# Patient Record
Sex: FEMALE | Race: WHITE | NOT HISPANIC OR LATINO | URBAN - METROPOLITAN AREA
[De-identification: names, ages, dates, MRNs, and addresses within clinical notes are randomized per-mention and may not be internally consistent; named-entity substitution may affect disease eponyms.]

---

## 2017-01-23 ENCOUNTER — OUTPATIENT (OUTPATIENT)
Dept: OUTPATIENT SERVICES | Facility: HOSPITAL | Age: 77
LOS: 1 days | Discharge: HOME | End: 2017-01-23

## 2017-06-27 DIAGNOSIS — Z12.31 ENCOUNTER FOR SCREENING MAMMOGRAM FOR MALIGNANT NEOPLASM OF BREAST: ICD-10-CM

## 2018-02-02 ENCOUNTER — OUTPATIENT (OUTPATIENT)
Dept: OUTPATIENT SERVICES | Facility: HOSPITAL | Age: 78
LOS: 1 days | Discharge: HOME | End: 2018-02-02

## 2018-02-06 DIAGNOSIS — M54.30 SCIATICA, UNSPECIFIED SIDE: ICD-10-CM

## 2018-02-06 DIAGNOSIS — H25.89 OTHER AGE-RELATED CATARACT: ICD-10-CM

## 2018-02-06 DIAGNOSIS — K58.9 IRRITABLE BOWEL SYNDROME WITHOUT DIARRHEA: ICD-10-CM

## 2018-02-06 DIAGNOSIS — G62.9 POLYNEUROPATHY, UNSPECIFIED: ICD-10-CM

## 2018-02-06 DIAGNOSIS — E03.9 HYPOTHYROIDISM, UNSPECIFIED: ICD-10-CM

## 2018-02-09 ENCOUNTER — OUTPATIENT (OUTPATIENT)
Dept: OUTPATIENT SERVICES | Facility: HOSPITAL | Age: 78
LOS: 1 days | Discharge: HOME | End: 2018-02-09

## 2018-02-09 VITALS
HEIGHT: 70 IN | OXYGEN SATURATION: 97 % | DIASTOLIC BLOOD PRESSURE: 78 MMHG | RESPIRATION RATE: 17 BRPM | SYSTOLIC BLOOD PRESSURE: 149 MMHG | HEART RATE: 80 BPM | TEMPERATURE: 98 F | WEIGHT: 141.98 LBS

## 2018-02-09 VITALS — RESPIRATION RATE: 17 BRPM | HEART RATE: 80 BPM | SYSTOLIC BLOOD PRESSURE: 150 MMHG | DIASTOLIC BLOOD PRESSURE: 86 MMHG

## 2018-02-09 DIAGNOSIS — N60.49 MAMMARY DUCT ECTASIA OF UNSPECIFIED BREAST: Chronic | ICD-10-CM

## 2018-02-09 DIAGNOSIS — Z98.890 OTHER SPECIFIED POSTPROCEDURAL STATES: Chronic | ICD-10-CM

## 2018-02-09 RX ORDER — SODIUM CHLORIDE 9 MG/ML
1000 INJECTION INTRAMUSCULAR; INTRAVENOUS; SUBCUTANEOUS
Qty: 0 | Refills: 0 | Status: DISCONTINUED | OUTPATIENT
Start: 2018-02-09 | End: 2018-02-24

## 2018-02-09 RX ORDER — ACETAMINOPHEN 500 MG
650 TABLET ORAL ONCE
Qty: 0 | Refills: 0 | Status: DISCONTINUED | OUTPATIENT
Start: 2018-02-09 | End: 2018-02-24

## 2018-02-09 RX ORDER — LEVOTHYROXINE SODIUM 125 MCG
1 TABLET ORAL
Qty: 0 | Refills: 0 | COMMUNITY

## 2018-02-09 RX ORDER — ZOLPIDEM TARTRATE 10 MG/1
1 TABLET ORAL
Qty: 0 | Refills: 0 | COMMUNITY

## 2018-02-09 RX ORDER — TAPENTADOL HYDROCHLORIDE 50 MG/1
1 TABLET, FILM COATED ORAL
Qty: 0 | Refills: 0 | COMMUNITY

## 2018-02-20 DIAGNOSIS — E03.9 HYPOTHYROIDISM, UNSPECIFIED: ICD-10-CM

## 2018-02-20 DIAGNOSIS — H25.89 OTHER AGE-RELATED CATARACT: ICD-10-CM

## 2019-11-08 PROBLEM — G35 MULTIPLE SCLEROSIS: Chronic | Status: ACTIVE | Noted: 2018-02-09

## 2019-11-08 PROBLEM — E78.6 LIPOPROTEIN DEFICIENCY: Chronic | Status: ACTIVE | Noted: 2018-02-09

## 2019-11-08 PROBLEM — H26.9 UNSPECIFIED CATARACT: Chronic | Status: ACTIVE | Noted: 2018-02-09

## 2019-11-08 PROBLEM — E03.9 HYPOTHYROIDISM, UNSPECIFIED: Chronic | Status: ACTIVE | Noted: 2018-02-09

## 2019-11-26 ENCOUNTER — OUTPATIENT (OUTPATIENT)
Dept: OUTPATIENT SERVICES | Facility: HOSPITAL | Age: 79
LOS: 1 days | Discharge: HOME | End: 2019-11-26
Payer: MEDICARE

## 2019-11-26 DIAGNOSIS — Z98.890 OTHER SPECIFIED POSTPROCEDURAL STATES: Chronic | ICD-10-CM

## 2019-11-26 DIAGNOSIS — N60.49 MAMMARY DUCT ECTASIA OF UNSPECIFIED BREAST: Chronic | ICD-10-CM

## 2019-11-26 DIAGNOSIS — Z12.31 ENCOUNTER FOR SCREENING MAMMOGRAM FOR MALIGNANT NEOPLASM OF BREAST: ICD-10-CM

## 2019-11-26 PROCEDURE — 77063 BREAST TOMOSYNTHESIS BI: CPT | Mod: 26

## 2019-11-26 PROCEDURE — 77067 SCR MAMMO BI INCL CAD: CPT | Mod: 26

## 2019-12-26 ENCOUNTER — APPOINTMENT (OUTPATIENT)
Dept: ENDOCRINOLOGY | Facility: CLINIC | Age: 79
End: 2019-12-26

## 2021-01-26 PROBLEM — Z00.00 ENCOUNTER FOR PREVENTIVE HEALTH EXAMINATION: Status: ACTIVE | Noted: 2021-01-26

## 2021-05-07 ENCOUNTER — RESULT CHARGE (OUTPATIENT)
Age: 81
End: 2021-05-07

## 2021-05-07 ENCOUNTER — APPOINTMENT (OUTPATIENT)
Dept: UROGYNECOLOGY | Facility: CLINIC | Age: 81
End: 2021-05-07
Payer: MEDICARE

## 2021-05-07 ENCOUNTER — OUTPATIENT (OUTPATIENT)
Dept: OUTPATIENT SERVICES | Facility: HOSPITAL | Age: 81
LOS: 1 days | Discharge: HOME | End: 2021-05-07

## 2021-05-07 VITALS
SYSTOLIC BLOOD PRESSURE: 130 MMHG | HEIGHT: 69 IN | DIASTOLIC BLOOD PRESSURE: 62 MMHG | WEIGHT: 136 LBS | BODY MASS INDEX: 20.14 KG/M2

## 2021-05-07 DIAGNOSIS — Z78.9 OTHER SPECIFIED HEALTH STATUS: ICD-10-CM

## 2021-05-07 DIAGNOSIS — Z98.890 OTHER SPECIFIED POSTPROCEDURAL STATES: Chronic | ICD-10-CM

## 2021-05-07 DIAGNOSIS — Z87.19 PERSONAL HISTORY OF OTHER DISEASES OF THE DIGESTIVE SYSTEM: ICD-10-CM

## 2021-05-07 DIAGNOSIS — G35 MULTIPLE SCLEROSIS: ICD-10-CM

## 2021-05-07 DIAGNOSIS — R33.9 RETENTION OF URINE, UNSPECIFIED: ICD-10-CM

## 2021-05-07 DIAGNOSIS — N60.49 MAMMARY DUCT ECTASIA OF UNSPECIFIED BREAST: Chronic | ICD-10-CM

## 2021-05-07 DIAGNOSIS — G62.9 POLYNEUROPATHY, UNSPECIFIED: ICD-10-CM

## 2021-05-07 DIAGNOSIS — E78.00 PURE HYPERCHOLESTEROLEMIA, UNSPECIFIED: ICD-10-CM

## 2021-05-07 LAB
BILIRUB UR QL STRIP: NORMAL
CLARITY UR: CLEAR
COLLECTION METHOD: NORMAL
GLUCOSE UR-MCNC: NORMAL
HCG UR QL: 0.2 EU/DL
HGB UR QL STRIP.AUTO: NORMAL
KETONES UR-MCNC: NORMAL
LEUKOCYTE ESTERASE UR QL STRIP: NORMAL
NITRITE UR QL STRIP: NORMAL
PH UR STRIP: 6
PROT UR STRIP-MCNC: NORMAL
SP GR UR STRIP: 1.02

## 2021-05-07 PROCEDURE — 99205 OFFICE O/P NEW HI 60 MIN: CPT

## 2021-05-07 PROCEDURE — 51701 INSERT BLADDER CATHETER: CPT

## 2021-05-07 RX ORDER — CIPROFLOXACIN HYDROCHLORIDE 500 MG/1
500 TABLET, FILM COATED ORAL
Qty: 14 | Refills: 0 | Status: COMPLETED | COMMUNITY
Start: 2021-05-07 | End: 2021-05-14

## 2021-05-07 NOTE — PHYSICAL EXAM
[Chaperone Present] : A chaperone was present in the examining room during all aspects of the physical examination [FreeTextEntry1] : Void: 600 cc\par PVR: 180 cc (normal PVR for this volume voided is 260cc or less)\par Urethra was prepped in sterile fashion and then a sterile catheter was used by me to drain the bladder.\par  \par No abdominal incisions noted\par normal perineal sensation\par absent perineal reflexes\par negative cough stress test\par positive atrophy\par positive urethral caruncle\par no prolapse\par positive urethral hypermobility\par bilateral levator ani spasm,  no tenderness\par no urethral tenderness\par no bladder tenderness\par no cervical tenderness\par no uterine tenderness\par 0/5 Kegel\par

## 2021-05-07 NOTE — DISCUSSION/SUMMARY
[FreeTextEntry1] : \par History of UTI-\par Advised the patient that recurrent UTIs are defined as having 3 or more positive urine culture in 1 year or 2 or more in 6 months, which she has not had. Advised to call the office if she feels like she has an infection so that we can arrange testing of her urine. If she keeps getting infections then I will recommend a workup of further evaluation of her kidneys and bladder and further prevention treatment including estrogen vaginal cream and daily antibiotic suppression. The patient voiced understanding and agrees with the plan.\par \par Atrophic vaginitis-\par We reviewed the risks, benefits, alternatives and indications of local estrogen therapy and I gave her a handout that covers this information. She does opt to begin this therapy. I have given her a prescription and specific instructions on how to use the estrogen cream, applied with a finger at a low dose for urogenital atrophy.\par \par History of incomplete bladder emptying-\par Advised the patient that the only thing that can cause intermittent incomplete bladder emptying is an active UTI, constipation or pelvic floor muscle hypertonicity. The patient will continue working on constipation control with her senna, we will work on management for her UTIs and if she starts not emptying well we can then have her start taking her flexeril twice a day and every day.

## 2021-05-07 NOTE — COUNSELING
[FreeTextEntry1] : \par We will notify you of the urine results\par \par We will work on getting the urine testing and the urodynamics from Dr Sorensen's office to determine if you need further evaluation of your kidneys and bladder\par \par Please take the cipro twice a day for 7 days\par \par Apply a pea size amount of the cream to the opening of the vagina every night for two weeks followed by three nights per week\par \par Please call my office if you have any issues with the cost or side effects of the medication.\par \par Follow up will be scheduled based on the urine results\par \par Please call the office if you feel like you have an infection so that we can arrange testing of your urine\par \par Please let my office know if you would like to schedule urodynamics at our office

## 2021-05-07 NOTE — HISTORY OF PRESENT ILLNESS
[FreeTextEntry1] : \par Pt with pelvic floor dysfunction here for urogynecologic evaluation. She describes: \par \par Chief PFD: UTI\par \par History of MS, seeing Dr Sorensen's group for years.\par Had urodynamics 2 years ago, PVR was not abnormal but was told that she does not empty well and that is why she gets infections\par Scheduled for urodynamics in 2 weeks\par Has multiple UTIs: symptoms: balance worsens, no hematuria, no frequency, no dysuria, gets treated with cipro and feels better\par No cysto performed\par 10/29/20: renal sono: no hydro\par Last one in 4/21. Feels like she has one now because she had an incontinence episode last night but has no worsening of balance issues\par \par Pelvic organ prolapse: no bulge, denies splinting\par Stress urinary incontinence: denies\par Overactive bladder syndrome: +frequency, urgency, no eneuresis, min urge incontinence daily, tried trospium for 3 months (severe dry mouth, needed to see ENT and dentist), no glaucoma\par Voiding dysfunction: yes Incomplete bladder emptying, yes hesitancy \par Lower urinary tract/vaginal symptoms: as above UTIs per year, no hematuria, no dysuria, no bladder pain \par Fecal incontinence: denies\par Defecatory dysfunction: sausage and Type I\par Sexual dysfunction: not active\par Pelvic pain: on flexeril once in a while, denies pelvic pain\par Vaginal dryness: denies \par \par Her pelvic floor symptoms are significantly bothersome and negatively impacting her quality of life. \par \par

## 2021-05-10 LAB
APPEARANCE: CLEAR
BACTERIA UR CULT: NORMAL
BILIRUBIN URINE: NEGATIVE
BLOOD URINE: NEGATIVE
COLOR: COLORLESS
GLUCOSE QUALITATIVE U: NEGATIVE
KETONES URINE: NEGATIVE
LEUKOCYTE ESTERASE URINE: NEGATIVE
NITRITE URINE: NEGATIVE
PH URINE: 6.5
PROTEIN URINE: NEGATIVE
SPECIFIC GRAVITY URINE: 1
UROBILINOGEN URINE: NORMAL

## 2021-05-14 ENCOUNTER — NON-APPOINTMENT (OUTPATIENT)
Age: 81
End: 2021-05-14

## 2021-05-20 DIAGNOSIS — N95.2 POSTMENOPAUSAL ATROPHIC VAGINITIS: ICD-10-CM

## 2021-05-20 DIAGNOSIS — Z87.440 PERSONAL HISTORY OF URINARY (TRACT) INFECTIONS: ICD-10-CM

## 2021-05-20 DIAGNOSIS — R33.9 RETENTION OF URINE, UNSPECIFIED: ICD-10-CM

## 2021-06-11 ENCOUNTER — OUTPATIENT (OUTPATIENT)
Dept: OUTPATIENT SERVICES | Facility: HOSPITAL | Age: 81
LOS: 1 days | Discharge: HOME | End: 2021-06-11

## 2021-06-11 ENCOUNTER — APPOINTMENT (OUTPATIENT)
Dept: UROGYNECOLOGY | Facility: CLINIC | Age: 81
End: 2021-06-11
Payer: MEDICARE

## 2021-06-11 VITALS — BODY MASS INDEX: 20.08 KG/M2 | SYSTOLIC BLOOD PRESSURE: 120 MMHG | DIASTOLIC BLOOD PRESSURE: 64 MMHG | WEIGHT: 136 LBS

## 2021-06-11 DIAGNOSIS — Z98.890 OTHER SPECIFIED POSTPROCEDURAL STATES: Chronic | ICD-10-CM

## 2021-06-11 DIAGNOSIS — N60.49 MAMMARY DUCT ECTASIA OF UNSPECIFIED BREAST: Chronic | ICD-10-CM

## 2021-06-11 PROCEDURE — 51797 INTRAABDOMINAL PRESSURE TEST: CPT | Mod: 26

## 2021-06-11 PROCEDURE — 51741 ELECTRO-UROFLOWMETRY FIRST: CPT | Mod: 26

## 2021-06-11 PROCEDURE — 51728 CYSTOMETROGRAM W/VP: CPT | Mod: 26

## 2021-06-11 PROCEDURE — 51784 ANAL/URINARY MUSCLE STUDY: CPT | Mod: 26

## 2021-06-22 DIAGNOSIS — Z87.440 PERSONAL HISTORY OF URINARY (TRACT) INFECTIONS: ICD-10-CM

## 2021-07-22 ENCOUNTER — NON-APPOINTMENT (OUTPATIENT)
Age: 81
End: 2021-07-22

## 2021-08-08 ENCOUNTER — TRANSCRIPTION ENCOUNTER (OUTPATIENT)
Age: 81
End: 2021-08-08

## 2021-09-21 ENCOUNTER — OUTPATIENT (OUTPATIENT)
Dept: OUTPATIENT SERVICES | Facility: HOSPITAL | Age: 81
LOS: 1 days | Discharge: HOME | End: 2021-09-21

## 2021-09-21 ENCOUNTER — APPOINTMENT (OUTPATIENT)
Dept: UROGYNECOLOGY | Facility: CLINIC | Age: 81
End: 2021-09-21
Payer: MEDICARE

## 2021-09-21 VITALS — BODY MASS INDEX: 19.64 KG/M2 | DIASTOLIC BLOOD PRESSURE: 75 MMHG | WEIGHT: 133 LBS | SYSTOLIC BLOOD PRESSURE: 125 MMHG

## 2021-09-21 DIAGNOSIS — N60.49 MAMMARY DUCT ECTASIA OF UNSPECIFIED BREAST: Chronic | ICD-10-CM

## 2021-09-21 DIAGNOSIS — Z98.890 OTHER SPECIFIED POSTPROCEDURAL STATES: Chronic | ICD-10-CM

## 2021-09-21 PROCEDURE — 51701 INSERT BLADDER CATHETER: CPT

## 2021-09-21 PROCEDURE — 99214 OFFICE O/P EST MOD 30 MIN: CPT | Mod: 25

## 2021-09-21 NOTE — PHYSICAL EXAM
[Chaperone Present] : A chaperone was present in the examining room during all aspects of the physical examination [No Acute Distress] : in no acute distress [Well developed] : well developed [Well Nourished] : ~L well nourished [FreeTextEntry1] : Urethra was prepped in sterile fashion and then a sterile catheter was used by me to drain the bladder.\par void: 5cc\par PVR: 180cc

## 2021-09-21 NOTE — HISTORY OF PRESENT ILLNESS
[FreeTextEntry1] : Patient is here for 2 months follow up for PVR check for YAAKOV.\par Last seen on 6/11/21 for UDS for YAAKOV.\par \par History of MS, seeing Dr Sorensen's group for years.\par Had urodynamics 2 years ago, PVR was not abnormal but was told that she does not empty well and that is why she gets infections\par \par s/p Trospium x 3 months, severe dry mouth\par no glaucoma\par \par 10/29/20: renal sono: no hydro\par S/p flexeril prn back pain\par \par no prolapse\par \par Records from Dr Sorensen's office reviewed:\par 4/21/21 NGTD\par 4/14/21 UCX: mixed janna\par 12/2/20: NGTD\par 11/10/20 NGTD\par 10/9/20 NGTD\par 6/8/20: NGTD\par \par Estrace cream \par \par Flexeril 5mg BID\par 6/11/21, UDS: intermittent YAAKOV, add Flexeril and repeat PVR\par \par Today, patient states that she's taking Flexeril 5mg BID but does not notice any different in her urination. Denies side effects. Patient does not feel she has an infection.\par \par \par

## 2021-09-21 NOTE — COUNSELING
[FreeTextEntry1] : If you feel like you have an infection it is important for you to call our office and we will arrange testing of your urine.\par \par We will contact you if the urine results are abnormal.\par \par Please use bowel recipe for constipation management. You may also try Smooth Move tea (available on [employer]) for constipation. \par \par Please STOP taking Flexeril 5 mg.\par \par Please begin taking Flexeril 10mg twice a day. \par \par Please begin taking Flomax 0.4 mg once at bedtime. It takes up to 6 weeks to go into full effect. Please  your refill when you complete the 1st bottle.\par \par Please continue to apply a pea size amount to the opening of the vagina three times a week. \par \par Please call my office if you have any issues with the cost or side effects of the medication. \par \par Schedule a 6 weeks follow up med check appointment and PVR check.\par

## 2021-09-23 DIAGNOSIS — Z87.440 PERSONAL HISTORY OF URINARY (TRACT) INFECTIONS: ICD-10-CM

## 2021-09-23 DIAGNOSIS — M62.89 OTHER SPECIFIED DISORDERS OF MUSCLE: ICD-10-CM

## 2021-09-23 DIAGNOSIS — K59.00 CONSTIPATION, UNSPECIFIED: ICD-10-CM

## 2021-09-23 DIAGNOSIS — R33.9 RETENTION OF URINE, UNSPECIFIED: ICD-10-CM

## 2021-09-23 DIAGNOSIS — N95.2 POSTMENOPAUSAL ATROPHIC VAGINITIS: ICD-10-CM

## 2021-09-23 LAB — BACTERIA UR CULT: NORMAL

## 2021-09-27 ENCOUNTER — NON-APPOINTMENT (OUTPATIENT)
Age: 81
End: 2021-09-27

## 2021-11-10 ENCOUNTER — APPOINTMENT (OUTPATIENT)
Dept: UROGYNECOLOGY | Facility: CLINIC | Age: 81
End: 2021-11-10
Payer: MEDICARE

## 2021-11-10 ENCOUNTER — OUTPATIENT (OUTPATIENT)
Dept: OUTPATIENT SERVICES | Facility: HOSPITAL | Age: 81
LOS: 1 days | Discharge: HOME | End: 2021-11-10

## 2021-11-10 VITALS — WEIGHT: 135 LBS | SYSTOLIC BLOOD PRESSURE: 145 MMHG | DIASTOLIC BLOOD PRESSURE: 75 MMHG | BODY MASS INDEX: 19.94 KG/M2

## 2021-11-10 DIAGNOSIS — Z98.890 OTHER SPECIFIED POSTPROCEDURAL STATES: Chronic | ICD-10-CM

## 2021-11-10 DIAGNOSIS — R33.9 RETENTION OF URINE, UNSPECIFIED: ICD-10-CM

## 2021-11-10 DIAGNOSIS — K59.00 CONSTIPATION, UNSPECIFIED: ICD-10-CM

## 2021-11-10 DIAGNOSIS — Z87.440 PERSONAL HISTORY OF URINARY (TRACT) INFECTIONS: ICD-10-CM

## 2021-11-10 DIAGNOSIS — N95.2 POSTMENOPAUSAL ATROPHIC VAGINITIS: ICD-10-CM

## 2021-11-10 DIAGNOSIS — N60.49 MAMMARY DUCT ECTASIA OF UNSPECIFIED BREAST: Chronic | ICD-10-CM

## 2021-11-10 DIAGNOSIS — M62.89 OTHER SPECIFIED DISORDERS OF MUSCLE: ICD-10-CM

## 2021-11-10 PROCEDURE — 51701 INSERT BLADDER CATHETER: CPT

## 2021-11-10 PROCEDURE — 99214 OFFICE O/P EST MOD 30 MIN: CPT | Mod: 25

## 2021-11-10 NOTE — DISCUSSION/SUMMARY
[FreeTextEntry1] : Incomplete Bladder Emptying\par Urine culture obtained.\par Will follow up.\par Will treat accordingly if necessary\par Cont Silodosin 4 mg QHS\par \par Muscle Hypertonicity\par Cont  Flexeril to 10mg BID\par \par Constipation\par Try bowel recipe, cont senna\par \par History of UTIs\par Cont estrace cream \par \par Vaginal Atrophy\par Cont estrace cream 3x a week\par \par \par \par

## 2021-11-10 NOTE — HISTORY OF PRESENT ILLNESS
[FreeTextEntry1] : Patient is here for 6 weeks med check and  PVR check for YAAKOV.\par Last seen on 9/21/21 for follow up PVR check: 180cc  \par \par History of MS, seeing Dr Sorensen's group for years.\par Had urodynamics 2 years ago, PVR was not abnormal but was told that she does not empty well and that is why she gets infections\par \par s/p Trospium x 3 months, severe dry mouth\par no glaucoma\par \par 10/29/20: renal sono: no hydro\par S/p flexeril prn back pain\par \par no prolapse\par \par Records from Dr Sorensen's office reviewed:\par 4/21/21 NGTD\par 4/14/21 UCX: mixed janna\par 12/2/20: NGTD\par 11/10/20 NGTD\par 10/9/20 NGTD\par 6/8/20: NGTD\par \par Estrace cream \par \par s/p Flexeril 5mg BID\par 6/11/21, UDS: intermittent YAAKOV, add Flexeril and repeat PVR\par \par Flexeril 10mg BID\par Pt is allergic to Sulfa and cannot take Flomax\par Silodosin 4mg QHS\par \par Today pt states that initially with Flexeril 10mg BID she felt that she was emptying her bladder better but now feels the same. C/o feeling that she doesn't empty her bladder completely every time. Feels that after she urinates and leaves the house in 2 hours she needs to urinate again. She is bothered by these symptoms. \par \par

## 2021-11-10 NOTE — PHYSICAL EXAM
[Chaperone Present] : A chaperone was present in the examining room during all aspects of the physical examination [FreeTextEntry1] : Urethra was prepped in sterile fashion and then a sterile catheter was used by me to drain the bladder.\par void: 0\par Cath: 280cc [No Acute Distress] : in no acute distress [Well developed] : well developed [Well Nourished] : ~L well nourished

## 2021-11-10 NOTE — COUNSELING
[FreeTextEntry1] : If you feel like you have an infection it is important for you to call our office and we will arrange testing of your urine.\par \par Please continue taking Silodosin 4mg and Flexeril 10mg twice a day.\par \par Please continue to apply a pea size amount to the opening of the vagina three times a week. \par \par Please call my office if you have any issues with the cost or side effects of the medication. \par \par I will call you after discussing with Dr Vallecillo. \par

## 2021-11-14 LAB — BACTERIA UR CULT: NORMAL

## 2021-11-16 ENCOUNTER — NON-APPOINTMENT (OUTPATIENT)
Age: 81
End: 2021-11-16

## 2021-11-17 ENCOUNTER — NON-APPOINTMENT (OUTPATIENT)
Age: 81
End: 2021-11-17

## 2021-11-17 RX ORDER — CYCLOBENZAPRINE HYDROCHLORIDE 10 MG/1
10 TABLET, FILM COATED ORAL TWICE DAILY
Qty: 60 | Refills: 5 | Status: DISCONTINUED | COMMUNITY
Start: 1900-01-01 | End: 2021-11-17

## 2021-12-10 ENCOUNTER — NON-APPOINTMENT (OUTPATIENT)
Age: 81
End: 2021-12-10

## 2021-12-20 ENCOUNTER — NON-APPOINTMENT (OUTPATIENT)
Age: 81
End: 2021-12-20

## 2022-01-19 RX ORDER — CIPROFLOXACIN HYDROCHLORIDE 500 MG/1
500 TABLET, FILM COATED ORAL
Qty: 10 | Refills: 0 | Status: COMPLETED | COMMUNITY
Start: 2022-01-19 | End: 2022-01-24

## 2022-01-21 ENCOUNTER — NON-APPOINTMENT (OUTPATIENT)
Age: 82
End: 2022-01-21

## 2022-01-28 ENCOUNTER — APPOINTMENT (OUTPATIENT)
Dept: UROGYNECOLOGY | Facility: CLINIC | Age: 82
End: 2022-01-28
Payer: MEDICARE

## 2022-01-28 ENCOUNTER — OUTPATIENT (OUTPATIENT)
Dept: OUTPATIENT SERVICES | Facility: HOSPITAL | Age: 82
LOS: 1 days | Discharge: HOME | End: 2022-01-28

## 2022-01-28 VITALS
WEIGHT: 135 LBS | BODY MASS INDEX: 19.99 KG/M2 | SYSTOLIC BLOOD PRESSURE: 140 MMHG | HEIGHT: 69 IN | DIASTOLIC BLOOD PRESSURE: 62 MMHG

## 2022-01-28 DIAGNOSIS — Z98.890 OTHER SPECIFIED POSTPROCEDURAL STATES: Chronic | ICD-10-CM

## 2022-01-28 DIAGNOSIS — N60.49 MAMMARY DUCT ECTASIA OF UNSPECIFIED BREAST: Chronic | ICD-10-CM

## 2022-01-28 PROCEDURE — 99214 OFFICE O/P EST MOD 30 MIN: CPT

## 2022-01-28 RX ORDER — TIZANIDINE 2 MG/1
2 TABLET ORAL
Qty: 60 | Refills: 1 | Status: DISCONTINUED | COMMUNITY
Start: 2021-11-17 | End: 2022-01-28

## 2022-01-28 NOTE — PHYSICAL EXAM
[Chaperone Present] : A chaperone was present in the examining room during all aspects of the physical examination [No Acute Distress] : in no acute distress [Well developed] : well developed [Well Nourished] : ~L well nourished [FreeTextEntry1] : Urethra was prepped in sterile fashion and then a sterile catheter was used by me to drain the bladder. \par void: 300 cc\par PVR: 100 cc

## 2022-01-28 NOTE — COUNSELING
[FreeTextEntry1] : If you feel like you have an infection it is important for you to call our office and we will arrange testing of your urine.\par \par We will contact you if the urine results are abnormal.\par \par Please continue taking Flexeril 10mg twice a day. Refills sent to your pharmacy.\par \par Please continue taking Silodosin 4mg once a day.  \par \par Please continue to apply a pea size amount to the opening of the vagina three times a week.\par \par Please call my office if you have any issues with the cost or side effects of the medication. \par \par Schedule a 6 months follow up med check appointment.\par

## 2022-01-28 NOTE — HISTORY OF PRESENT ILLNESS
[FreeTextEntry1] : Patient is here for 2 months med check and PVR check for YAAKOV.\par Last seen on 11/10/2021 for med check.\par \par History of MS, seeing Dr Sorensen's group for years.\par Had urodynamics 2 years ago, PVR was not abnormal but was told that she does not empty well and that is why she gets infections\par \par s/p Trospium x 3 months, severe dry mouth\par no glaucoma\par \par 10/29/20: renal sono: no hydro\par S/p flexeril prn back pain\par \par no prolapse\par \par Records from Dr Sorensen's office reviewed:\par 4/21/21 NGTD\par 4/14/21 UCX: mixed janna\par 12/2/20: NGTD\par 11/10/20 NGTD\par 10/9/20 NGTD\par 6/8/20: NGTD\par \par Estrace cream \par \par s/p Flexeril 5mg BID\par 6/11/21, UDS: intermittent YAAKOV, add Flexeril and repeat PVR\par \par Flexeril 10mg BID\par Pt is allergic to Sulfa and cannot take Flomax\par Silodosin 4mg QHS\par \par S/p Baclofen, said made her feet feel heavy\par S/p Tizanidine 2mg: make her legs weak, heavy\par \par 1/19/2021 Patient was treated for UTI (culture was not obtained) with Cipro 500 mg bid for 5 days. Patient reports taking antibiotics for 7 days with relief of her symptoms.  \par \par Today, patient states she tried Tizanidine and it made her legs weak and heavy and she went back to taking Flexeril 10 mg BID and is noticing improvement. Denies side effects. Patient does not feel she has an infection. Still does not feel that she empties her bladder completely. \par \par

## 2022-01-28 NOTE — DISCUSSION/SUMMARY
[FreeTextEntry1] : Incomplete Bladder Emptying-\par Urine culture obtained.\par Will follow up.\par Will treat accordingly if necessary\par Emptied bladder well today\par Cont Silodosin 4mg QHS\par Follow up 6 months\par \par Muscle Hypertonicity\par Continue Flexeril 10mg BID\par \par History of UTIs\par continue estrace cream\par Follow up next visit if pt is still taking it due to enlargement of her breasts\par \par Atrophic Vaginitis:\par Advised to continue to apply a pea size amount of the cream to the opening of the vagina three nights per week.\par

## 2022-01-30 LAB — BACTERIA UR CULT: NORMAL

## 2022-01-31 DIAGNOSIS — R33.9 RETENTION OF URINE, UNSPECIFIED: ICD-10-CM

## 2022-01-31 DIAGNOSIS — M62.89 OTHER SPECIFIED DISORDERS OF MUSCLE: ICD-10-CM

## 2022-02-04 ENCOUNTER — OUTPATIENT (OUTPATIENT)
Dept: OUTPATIENT SERVICES | Facility: HOSPITAL | Age: 82
LOS: 1 days | Discharge: HOME | End: 2022-02-04
Payer: MEDICARE

## 2022-02-04 DIAGNOSIS — Z12.31 ENCOUNTER FOR SCREENING MAMMOGRAM FOR MALIGNANT NEOPLASM OF BREAST: ICD-10-CM

## 2022-02-04 DIAGNOSIS — Z98.890 OTHER SPECIFIED POSTPROCEDURAL STATES: Chronic | ICD-10-CM

## 2022-02-04 DIAGNOSIS — N60.49 MAMMARY DUCT ECTASIA OF UNSPECIFIED BREAST: Chronic | ICD-10-CM

## 2022-02-04 PROCEDURE — 77067 SCR MAMMO BI INCL CAD: CPT | Mod: 26

## 2022-02-04 PROCEDURE — 77063 BREAST TOMOSYNTHESIS BI: CPT | Mod: 26

## 2022-02-08 DIAGNOSIS — Z78.0 ASYMPTOMATIC MENOPAUSAL STATE: ICD-10-CM

## 2022-02-08 DIAGNOSIS — Z13.820 ENCOUNTER FOR SCREENING FOR OSTEOPOROSIS: ICD-10-CM

## 2022-02-08 DIAGNOSIS — M89.9 DISORDER OF BONE, UNSPECIFIED: ICD-10-CM

## 2022-02-22 NOTE — ASU PREOP CHECKLIST - ALLERGY BAND ON
Number Of Freeze-Thaw Cycles: 3 freeze-thaw cycles
Post-Care Instructions: I reviewed with the patient in detail post-care instructions. Patient is to wear sunprotection, and avoid picking at any of the treated lesions. Pt may apply Vaseline to crusted or scabbing areas.
Detail Level: Detailed
Render Note In Bullet Format When Appropriate: No
Duration Of Freeze Thaw-Cycle (Seconds): 2
Show Applicator Variable?: Yes
Consent: The patient's consent was obtained including but not limited to risks of crusting, scabbing, blistering, scarring, darker or lighter pigmentary change, recurrence, incomplete removal and infection.
done

## 2022-05-13 ENCOUNTER — APPOINTMENT (OUTPATIENT)
Dept: UROGYNECOLOGY | Facility: CLINIC | Age: 82
End: 2022-05-13
Payer: MEDICARE

## 2022-05-13 ENCOUNTER — OUTPATIENT (OUTPATIENT)
Dept: OUTPATIENT SERVICES | Facility: HOSPITAL | Age: 82
LOS: 1 days | Discharge: HOME | End: 2022-05-13

## 2022-05-13 VITALS
BODY MASS INDEX: 20.32 KG/M2 | HEIGHT: 69 IN | SYSTOLIC BLOOD PRESSURE: 128 MMHG | DIASTOLIC BLOOD PRESSURE: 70 MMHG | WEIGHT: 137.19 LBS

## 2022-05-13 DIAGNOSIS — N60.49 MAMMARY DUCT ECTASIA OF UNSPECIFIED BREAST: Chronic | ICD-10-CM

## 2022-05-13 DIAGNOSIS — Z98.890 OTHER SPECIFIED POSTPROCEDURAL STATES: Chronic | ICD-10-CM

## 2022-05-13 PROCEDURE — 99214 OFFICE O/P EST MOD 30 MIN: CPT | Mod: 25

## 2022-05-13 PROCEDURE — 51701 INSERT BLADDER CATHETER: CPT

## 2022-05-15 LAB — BACTERIA UR CULT: NORMAL

## 2022-05-15 NOTE — HISTORY OF PRESENT ILLNESS
[FreeTextEntry1] : Patient is here for 5 months med check for urge incontinence and YAAKOV.\par Last seen on 1/28/22 for med check, PVR check: 100cc\par \par History of MS, seeing Dr Sorensen's group for years.\par Had UDS 2020, PVR was not abnormal but was told that she does not empty well and that is why she gets infections\par \par s/p Trospium x 3 months, severe dry mouth\par no glaucoma\par \par 10/29/20: renal sono: no hydro\par S/p flexeril prn back pain\par \par no prolapse\par \par Estrace cream \par \par s/p Flexeril 5mg BID\par 6/11/21, UDS: intermittent YAAKOV, add Flexeril and repeat PVR\par \par S/p Baclofen, said made her feet feel heavy\par S/p Tizanidine 2mg: make her legs weak, heavy\par \par Flexeril 10mg BID\par Pt is allergic to Sulfa and cannot take Flomax\par Silodosin 4mg QHS\par \par 1/28/22, PVR: 100cc\par \par Today, patient states that she feels that she is not emptying the bladder well again. Still taking all the same meds. Denies side effects. Patient does not feel she has an infection. C/o urgency during the day and leaking a little when she gets up to urinate. At night she leaks through her pads because she doesn't make it to the bathroom when she has an urge to go. \par

## 2022-05-15 NOTE — DISCUSSION/SUMMARY
[FreeTextEntry1] : Incomplete Bladder Emptying\par Pt emptied the bladder well today\par Cont Silodosin 4mg QHS\par \par Muscle Hypertonicity\par Continue Flexeril 10mg BID\par \par History of UTIs\par Urine culture obtained.\par Will follow up.\par Will treat accordingly if necessary\par \par Atrophic Vaginitis:\par Advised to continue to apply a pea size amount of the cream to the opening of the vagina three nights per week.\par \par Urge Incontinence\par Rx Myrbetriq 25mg QD (1R)\par Follow up 6 weeks for med check and PVR check\par

## 2022-05-15 NOTE — PHYSICAL EXAM
[Chaperone Present] : A chaperone was present in the examining room during all aspects of the physical examination [No Acute Distress] : in no acute distress [Well developed] : well developed [Well Nourished] : ~L well nourished [FreeTextEntry1] : Urethra was prepped in sterile fashion and then a sterile catheter (14F) was used by me to drain the bladder. The patient tolerated the procedure well.\par \par void: 130cc\par PVR: 60cc

## 2022-05-15 NOTE — COUNSELING
[FreeTextEntry1] : If you feel like you have an infection it is important for you to call our office and we will arrange testing of your urine.\par \par Please continue taking Flexeril 10mg, Silodosin 4mg for frequency. Refills sent to your pharmacy.\par \par Please begin taking Myrbetriq 25 mg once in the evening. It takes up to 6 weeks to go into full effect. Please  your refill when you complete the 1st bottle.\par \par Please continue to apply a pea size amount to the opening of the vagina three times a week. \par \par Please call my office if you have any issues with the cost or side effects of the medication. \par \par Schedule a 6 weeks follow up med check appointment with MARGARITA Alford. PVR check\par

## 2022-05-17 DIAGNOSIS — M62.89 OTHER SPECIFIED DISORDERS OF MUSCLE: ICD-10-CM

## 2022-05-17 DIAGNOSIS — N95.2 POSTMENOPAUSAL ATROPHIC VAGINITIS: ICD-10-CM

## 2022-05-17 DIAGNOSIS — Z87.440 PERSONAL HISTORY OF URINARY (TRACT) INFECTIONS: ICD-10-CM

## 2022-05-17 DIAGNOSIS — N39.41 URGE INCONTINENCE: ICD-10-CM

## 2022-05-17 DIAGNOSIS — R33.9 RETENTION OF URINE, UNSPECIFIED: ICD-10-CM

## 2022-05-20 ENCOUNTER — NON-APPOINTMENT (OUTPATIENT)
Age: 82
End: 2022-05-20

## 2022-05-24 ENCOUNTER — NON-APPOINTMENT (OUTPATIENT)
Age: 82
End: 2022-05-24

## 2022-06-13 ENCOUNTER — NON-APPOINTMENT (OUTPATIENT)
Age: 82
End: 2022-06-13

## 2022-06-16 ENCOUNTER — NON-APPOINTMENT (OUTPATIENT)
Age: 82
End: 2022-06-16

## 2022-06-22 ENCOUNTER — APPOINTMENT (OUTPATIENT)
Dept: UROGYNECOLOGY | Facility: CLINIC | Age: 82
End: 2022-06-22
Payer: MEDICARE

## 2022-06-22 ENCOUNTER — OUTPATIENT (OUTPATIENT)
Dept: OUTPATIENT SERVICES | Facility: HOSPITAL | Age: 82
LOS: 1 days | Discharge: HOME | End: 2022-06-22

## 2022-06-22 VITALS
SYSTOLIC BLOOD PRESSURE: 128 MMHG | WEIGHT: 138.44 LBS | DIASTOLIC BLOOD PRESSURE: 64 MMHG | BODY MASS INDEX: 20.51 KG/M2 | HEIGHT: 69 IN

## 2022-06-22 DIAGNOSIS — Z98.890 OTHER SPECIFIED POSTPROCEDURAL STATES: Chronic | ICD-10-CM

## 2022-06-22 DIAGNOSIS — N60.49 MAMMARY DUCT ECTASIA OF UNSPECIFIED BREAST: Chronic | ICD-10-CM

## 2022-06-22 PROCEDURE — 99214 OFFICE O/P EST MOD 30 MIN: CPT

## 2022-06-22 NOTE — PHYSICAL EXAM
[No Acute Distress] : in no acute distress [Well developed] : well developed [Well Nourished] : ~L well nourished Ambulatory

## 2022-06-22 NOTE — HISTORY OF PRESENT ILLNESS
[FreeTextEntry1] : Patient is here for 6 weeks med check for urge incontinence and YAAKOV. \par Last seen on 5/13/22 for med check.\par \par History of MS, seeing Dr Sorensen's group for years.\par Had UDS 2020, PVR was not abnormal but was told that she does not empty well and that is why she gets infections\par \par s/p Trospium x 3 months, severe dry mouth\par no glaucoma\par \par 10/29/20: renal sono: no hydro\par S/p flexeril prn back pain\par \par no prolapse\par \par Estrace cream \par \par s/p Flexeril 5mg BID\par 6/11/21, UDS: intermittent YAAKOV, add Flexeril and repeat PVR\par \par S/p Baclofen, said made her feet feel heavy\par S/p Tizanidine 2mg: make her legs weak, heavy\par \par Flexeril 10mg BID\par Pt is allergic to Sulfa and cannot take Flomax\par Silodosin 4mg QHS\par \par 1/28/22, PVR: 100cc\par 5/13/22, PVR: 30cc (on Flexeril 10mg BID and Silodosin 4mgQHS)\par \par Myrbetriq 25mg QD\par \par Today, patient states she is very happy with Myrbetriq 25 mg QD and is noticing significant improvement. Feels 80% better. Denies side effects. Patient does not feel she has an infection. At  night now her pad stays dry. \par \par Patient would like to continue all her current meds. \par

## 2022-06-22 NOTE — COUNSELING
[FreeTextEntry1] : If you feel like you have an infection it is important for you to call our office and we will arrange testing of your urine.\par \par Please continue taking Myrbetriq 25mg for frequency. Refills sent to your pharmacy.\par \par Please continue taking Flexeril 10 mg twice a day for hypertonicity. Please call when you need refills. \par \par Please continue taking Silodosin 4 mg for incomplete bladder emptying. Please call when you need refills. \par \par Please continue to apply a pea size amount to the opening of the vagina three times a week. \par \par Please call my office if you have any issues with the cost or side effects of the medication. \par \par Schedule a 6 months follow up med check appointment.\par

## 2022-06-22 NOTE — DISCUSSION/SUMMARY
[FreeTextEntry1] : Urge Incontinence-\par Patient happy with Myrbetriq 25 mg QD.\par Refills provided. 90 days supply with 1 refills.\par Precautions reviewed.\par Will return in 6 months for follow up or earlier if she has any issues.\par \par Atrophic Vaginitis:\par Advised to continue to apply a pea size amount of the cream to the opening of the vagina three nights per week.\par \par Incomplete Bladder Emptying\par Cont Silodosin 4mg QHS\par \par Muscle Hypertonicity\par Continue Flexeril 10mg BID\par \par History of UTIs\par Advised to let us know when she has urinary symptoms to arrange the testing of the urine. Blank referrals given for urine testing in case she feels symptoms on the weekends. \par

## 2022-06-23 DIAGNOSIS — Z87.440 PERSONAL HISTORY OF URINARY (TRACT) INFECTIONS: ICD-10-CM

## 2022-06-23 DIAGNOSIS — R33.9 RETENTION OF URINE, UNSPECIFIED: ICD-10-CM

## 2022-06-23 DIAGNOSIS — N95.2 POSTMENOPAUSAL ATROPHIC VAGINITIS: ICD-10-CM

## 2022-06-23 DIAGNOSIS — M65.89 OTHER SYNOVITIS AND TENOSYNOVITIS, MULTIPLE SITES: ICD-10-CM

## 2022-06-23 DIAGNOSIS — N39.41 URGE INCONTINENCE: ICD-10-CM

## 2022-07-08 ENCOUNTER — APPOINTMENT (OUTPATIENT)
Dept: UROGYNECOLOGY | Facility: CLINIC | Age: 82
End: 2022-07-08

## 2022-07-21 ENCOUNTER — APPOINTMENT (OUTPATIENT)
Dept: UROGYNECOLOGY | Facility: CLINIC | Age: 82
End: 2022-07-21

## 2022-07-27 ENCOUNTER — APPOINTMENT (OUTPATIENT)
Dept: UROGYNECOLOGY | Facility: CLINIC | Age: 82
End: 2022-07-27

## 2022-10-31 ENCOUNTER — APPOINTMENT (OUTPATIENT)
Dept: UROGYNECOLOGY | Facility: CLINIC | Age: 82
End: 2022-10-31

## 2022-10-31 ENCOUNTER — OUTPATIENT (OUTPATIENT)
Dept: OUTPATIENT SERVICES | Facility: HOSPITAL | Age: 82
LOS: 1 days | Discharge: HOME | End: 2022-10-31

## 2022-10-31 VITALS — WEIGHT: 138 LBS | SYSTOLIC BLOOD PRESSURE: 150 MMHG | BODY MASS INDEX: 20.38 KG/M2 | DIASTOLIC BLOOD PRESSURE: 70 MMHG

## 2022-10-31 DIAGNOSIS — R33.9 RETENTION OF URINE, UNSPECIFIED: ICD-10-CM

## 2022-10-31 DIAGNOSIS — Z98.890 OTHER SPECIFIED POSTPROCEDURAL STATES: Chronic | ICD-10-CM

## 2022-10-31 DIAGNOSIS — M62.89 OTHER SPECIFIED DISORDERS OF MUSCLE: ICD-10-CM

## 2022-10-31 DIAGNOSIS — N95.2 POSTMENOPAUSAL ATROPHIC VAGINITIS: ICD-10-CM

## 2022-10-31 DIAGNOSIS — Z87.440 PERSONAL HISTORY OF URINARY (TRACT) INFECTIONS: ICD-10-CM

## 2022-10-31 DIAGNOSIS — N60.49 MAMMARY DUCT ECTASIA OF UNSPECIFIED BREAST: Chronic | ICD-10-CM

## 2022-10-31 DIAGNOSIS — N39.41 URGE INCONTINENCE: ICD-10-CM

## 2022-10-31 PROCEDURE — 51701 INSERT BLADDER CATHETER: CPT

## 2022-10-31 PROCEDURE — 99214 OFFICE O/P EST MOD 30 MIN: CPT | Mod: 25

## 2022-10-31 RX ORDER — DIAZEPAM 5 MG/1
5 TABLET ORAL
Refills: 0 | Status: ACTIVE | COMMUNITY

## 2022-10-31 RX ORDER — SENNOSIDES 8.6 MG TABLETS 8.6 MG/1
TABLET ORAL
Refills: 0 | Status: ACTIVE | COMMUNITY

## 2022-10-31 RX ORDER — TAPENTADOL HYDROCHLORIDE 75 MG/1
75 TABLET, FILM COATED ORAL
Refills: 0 | Status: ACTIVE | COMMUNITY

## 2022-10-31 RX ORDER — AMOXICILLIN 500 MG/1
500 CAPSULE ORAL
Qty: 21 | Refills: 0 | Status: DISCONTINUED | COMMUNITY
Start: 2022-09-01

## 2022-10-31 RX ORDER — ZOLPIDEM TARTRATE 5 MG/1
TABLET ORAL
Refills: 0 | Status: ACTIVE | COMMUNITY

## 2022-10-31 RX ORDER — PRUCALOPRIDE 2 MG/1
2 TABLET, FILM COATED ORAL
Qty: 15 | Refills: 0 | Status: ACTIVE | COMMUNITY
Start: 2022-01-12

## 2022-10-31 RX ORDER — LEVOTHYROXINE SODIUM 137 UG/1
TABLET ORAL
Refills: 0 | Status: ACTIVE | COMMUNITY

## 2022-11-01 NOTE — COUNSELING
[FreeTextEntry1] : If you feel like you have an infection it is important for you to call our office and we will arrange testing of your urine.\par \par Please continue taking Myrbetriq 25mg for frequency. Please call when you need refills\par \par Please continue taking Flexeril 10 mg twice a day for hypertonicity. Refills sent to your pharmacy\par \par Please continue taking Silodosin 4 mg for incomplete bladder emptying. Please call when you need refills. \par \par \par Please continue to apply a pea size amount to the opening of the vagina three times a week. \par \par Please call my office if you have any issues with the cost or side effects of the medication. \par \par Schedule a 6 months follow up med check appointment with MARGARITA Mcpherson or MARGARITA Alford.\par

## 2022-11-01 NOTE — DISCUSSION/SUMMARY
[FreeTextEntry1] : \par Urge Incontinence-\par Patient happy with Myrbetriq 25 mg QD.\par Advised to call for refills, patient will be following up with PCP regarding general health and BP\par Precautions reviewed.\par Will return in 6 months for follow up or earlier if she has any issues.\par \par Atrophic Vaginitis:\par Advised to continue to apply a pea size amount of the cream to the opening of the vagina three nights per week.\par \par Incomplete Bladder Emptying\par PVR WNL today\par Cont Silodosin 4mg QHS\par \par Muscle Hypertonicity\par Continue Flexeril 10mg BID\par \par History of UTIs\par Advised to let us know when she has urinary symptoms to arrange the testing of the urine, patient agrees to call the office. Patient notes that her daughter will prescribe antibiotics for her if she needs, she notes neurological symptoms for UTIs\par  \par

## 2022-11-01 NOTE — HISTORY OF PRESENT ILLNESS
[FreeTextEntry1] : Patient is here for 5 months med check for urge incontinence.\par Last seen on 6/22/2022 for med check.\par \par History of MS, seeing Dr Sorensen's group for years.\par Had UDS 2020, PVR was not abnormal but was told that she does not empty well and that is why she gets infections\par \par s/p Trospium x 3 months, severe dry mouth\par no glaucoma\par \par 10/29/20: renal sono: no hydro\par S/p flexeril prn back pain\par \par no prolapse\par \par Estrace cream \par \par s/p Flexeril 5mg BID\par 6/11/21, UDS: intermittent YAAKOV, add Flexeril and repeat PVR\par \par S/p Baclofen, said made her feet feel heavy\par S/p Tizanidine 2mg: make her legs weak, heavy\par \par Flexeril 10mg BID\par Pt is allergic to Sulfa and cannot take Flomax\par Silodosin 4mg QHS\par \par 1/28/22, PVR: 100cc\par 5/13/22, PVR: 30cc (on Flexeril 10mg BID and Silodosin 4mgQHS)\par \par Myrbetriq 25mg QD\par \par Today, patient states she is happy with Myrbetriq 25 mg, Flexeril 10 mg BID, Silodosin 4 mg and estrace cream once a week and is noticing improvement. Denies side effects. Patient does not feel she has an infection.\par \par Patient would like to continue Myrbetriq 25 mg, Flexeril 10 mg BID, Silodosin 4 mg and estrace cream\par \par Patient feels that she usually empties her bladder ok, sometimes she has to wait a little to empty her bladder. \par

## 2022-11-01 NOTE — PHYSICAL EXAM
[Chaperone Present] : A chaperone was present in the examining room during all aspects of the physical examination [No Acute Distress] : in no acute distress [Well developed] : well developed [Well Nourished] : ~L well nourished [FreeTextEntry1] : Indication: Incomplete Bladder Emptying\par Urethra was prepped in sterile fashion and then a sterile non- indwelling catheter (14F) was used by me to drain the bladder. The patient tolerated the procedure well.\par void: 200 cc\par PVR: 70 cc

## 2022-11-03 ENCOUNTER — NON-APPOINTMENT (OUTPATIENT)
Age: 82
End: 2022-11-03

## 2022-11-14 ENCOUNTER — NON-APPOINTMENT (OUTPATIENT)
Age: 82
End: 2022-11-14

## 2022-11-30 ENCOUNTER — NON-APPOINTMENT (OUTPATIENT)
Age: 82
End: 2022-11-30

## 2022-12-09 ENCOUNTER — APPOINTMENT (OUTPATIENT)
Dept: UROGYNECOLOGY | Facility: CLINIC | Age: 82
End: 2022-12-09

## 2023-05-03 ENCOUNTER — APPOINTMENT (OUTPATIENT)
Dept: UROGYNECOLOGY | Facility: CLINIC | Age: 83
End: 2023-05-03
Payer: MEDICARE

## 2023-05-03 VITALS
HEIGHT: 69 IN | WEIGHT: 138 LBS | HEART RATE: 83 BPM | BODY MASS INDEX: 20.44 KG/M2 | DIASTOLIC BLOOD PRESSURE: 71 MMHG | SYSTOLIC BLOOD PRESSURE: 149 MMHG

## 2023-05-03 PROCEDURE — 99214 OFFICE O/P EST MOD 30 MIN: CPT | Mod: 25

## 2023-05-03 PROCEDURE — 51701 INSERT BLADDER CATHETER: CPT

## 2023-05-03 RX ORDER — CYCLOBENZAPRINE HYDROCHLORIDE 10 MG/1
10 TABLET, FILM COATED ORAL TWICE DAILY
Qty: 180 | Refills: 1 | Status: ACTIVE | COMMUNITY
Start: 2022-01-28 | End: 1900-01-01

## 2023-05-03 RX ORDER — CIPROFLOXACIN HYDROCHLORIDE 500 MG/1
500 TABLET, FILM COATED ORAL TWICE DAILY
Qty: 10 | Refills: 0 | Status: COMPLETED | COMMUNITY
Start: 2022-11-03 | End: 2023-05-03

## 2023-05-03 NOTE — COUNSELING
[FreeTextEntry1] : If you feel like you have an infection it is important for you to call our office and we will arrange testing of your urine.\par \par We will contact you if the urine results are abnormal.\par \par Please continue taking Myrbetriq 25 mg for frequency. Refills sent to your pharmacy.\par \par Please increase Flexeril 10 mg twice a day. It takes up to 6 weeks to go into full effect. Please  your refill when you complete the 1st bottle.\par \par Please continue to take Silodosin 4 mg at bedtime. \par \par Please continue to apply a pea size amount of estrogen cream to the opening of the vagina three times a week. \par \par Please call my office if you have any issues with the cost or side effects of the medication. \par \par Schedule a 6 weeks follow up med check and PVR check appointment with MARGARITA Alford.\par

## 2023-05-03 NOTE — DISCUSSION/SUMMARY
[FreeTextEntry1] : Urge Incontinence-\par Patient happy with Myrbetriq 25 mg QD.\par Refills provided. 30 days supply with 1 refills.\par Precautions reviewed.\par Will return in 6 weeks for follow up or earlier if she has any issues.\par \par Incomplete bladder emptying\par PVR elevated today, 250cc\par Urine culture obtained.\par Will follow up.\par Will treat accordingly if necessary\par Will check PVR in 6 weeks \par \par Muscle Hypertonicity\par Advised to increase Flexeril to 10mg BID to help empty the bladder better\par \par Atrophic Vaginitis:\par Advised to continue to apply a pea size amount of the cream to the opening of the vagina three nights per week.\par

## 2023-05-03 NOTE — HISTORY OF PRESENT ILLNESS
[FreeTextEntry1] : Patient is here for 6 months med check for urge incontinence.\par Last seen on 10/31/22 for med check.\par \par History of MS, seeing Dr Sorensen's group for years.\par Had UDS 2020, PVR was not abnormal but was told that she does not empty well and that is why she gets infections\par \par s/p Trospium x 3 months, severe dry mouth\par no glaucoma\par \par 10/29/20: renal sono: no hydro\par S/p flexeril prn back pain\par \par no prolapse\par \par Estrace cream \par \par s/p Flexeril 5mg BID\par 6/11/21, UDS: intermittent YAAKOV, add Flexeril and repeat PVR\par \par S/p Baclofen, said made her feet feel heavy\par S/p Tizanidine 2mg: make her legs weak, heavy\par \par Flexeril 10mg BID\par Pt is allergic to Sulfa and cannot take Flomax\par Silodosin 4mg QHS\par \par 1/28/22, PVR: 100cc\par 5/13/22, PVR: 30cc (on Flexeril 10mg BID and Silodosin 4mgQHS)\par \par Myrbetriq 25mg QD\par \par Today, patient states she is happy with Myrbetriq 25 mg QD and is noticing improvement, feels 50% better. Takes Flexeril 10 mg once a day only. Uses Estrace cream once a week. Denies side effects. Patient does not feel she has an infection. Feels that she's not emptying the bladder completely. \par

## 2023-05-03 NOTE — PHYSICAL EXAM
[Chaperone Present] : A chaperone was present in the examining room during all aspects of the physical examination [FreeTextEntry1] : Indication: incomplete bladder emptying\par Urethra was prepped in sterile fashion and then a sterile non indwelling catheter (14F) was used by me to drain the bladder. The patient tolerated the procedure well. \par \par void: 0\par PVR: 250cc [No Acute Distress] : in no acute distress [Well developed] : well developed [Well Nourished] : ~L well nourished

## 2023-05-07 LAB — URINE CULTURE <10: NORMAL

## 2023-05-10 RX ORDER — ESTRADIOL 0.1 MG/G
0.1 CREAM VAGINAL
Qty: 1 | Refills: 1 | Status: ACTIVE | COMMUNITY
Start: 2021-05-07 | End: 1900-01-01

## 2023-06-15 ENCOUNTER — NON-APPOINTMENT (OUTPATIENT)
Age: 83
End: 2023-06-15

## 2023-06-23 ENCOUNTER — APPOINTMENT (OUTPATIENT)
Dept: UROGYNECOLOGY | Facility: CLINIC | Age: 83
End: 2023-06-23
Payer: MEDICARE

## 2023-06-23 VITALS
SYSTOLIC BLOOD PRESSURE: 145 MMHG | HEIGHT: 69 IN | DIASTOLIC BLOOD PRESSURE: 62 MMHG | WEIGHT: 136 LBS | HEART RATE: 75 BPM | BODY MASS INDEX: 20.14 KG/M2

## 2023-06-23 PROCEDURE — 51701 INSERT BLADDER CATHETER: CPT

## 2023-06-23 PROCEDURE — 99214 OFFICE O/P EST MOD 30 MIN: CPT | Mod: 25

## 2023-06-23 NOTE — HISTORY OF PRESENT ILLNESS
[FreeTextEntry1] : Patient is here for 6 weeks med check for urge incontinence, YAAKOV. \par Last seen on 5/3/23 for med check.\par \par \par History of MS, seeing Dr Sorensen[facility] group for years.\par Had UDS 2020, PVR was not abnormal but was told that she does not empty well and that is why she gets infections\par \par s/p Trospium x 3 months, severe dry mouth\par no glaucoma\par \par 10/29/20: renal sono: no hydro\par S/p flexeril prn back pain\par \par no prolapse\par \par Estrace cream \par \par s/p Flexeril 5mg BID\par 6/11/21, UDS: intermittent YAAKOV, add Flexeril and repeat PVR\par \par S/p Baclofen, said made her feet feel heavy\par S/p Tizanidine 2mg: make her legs weak, heavy\par \par Flexeril 10mg BID\par Pt is allergic to Sulfa and cannot take Flomax\par Silodosin 4mg QHS\par \par 1/28/22, PVR: 100cc\par 5/13/22, PVR: 30cc (on Flexeril 10mg BID and Silodosin 4mgQHS)\par \par Myrbetriq 25mg QD\par \par \par Today, patient states she is happy with Myrbetriq 25 mg QD and is noticing improvement. Denies side effects. Patient does not feel she has an infection. Feels that she's able to urinate better when taking Myrbetriq 25mg. Still takes Silodosin 4mg QHS and Flexeril 10mg BID. Has had 2 UTIs since the last visit, she usually does a dipstick at home and takes Cipro that her daughter (NP) usually prescribes for her. States that her symptoms are usually neurological weakness, confusion, she cannot drive anywhere at that time to test the urine. She's had this going on for many many year and usually taking Cipro for few days with very good benefit. \par \par Patient would like to continue all current medications. She feels that she empties well. Will retest in a month.\par

## 2023-06-23 NOTE — COUNSELING
[FreeTextEntry1] : If you feel like you have an infection it is important for you to call our office and we will arrange testing of your urine.\par \par We will contact you if the urine results are abnormal.\par \par Please continue taking Myrbetriq 25 mg for frequency. Refills sent to your pharmacy.\par \par Please continue to take Silodosin 4mg once at bedtime and Flexeril 10mg twice a day.\par \par Please continue to apply a pea size amount of estrogen cream to the opening of the vagina three times a week. \par \par Please call my office if you have any issues with the cost or side effects of the medication. \par \par Schedule a 1 month follow up med check/PVR check appointment with MARGARITA Alford.\par

## 2023-06-23 NOTE — DISCUSSION/SUMMARY
[FreeTextEntry1] : Urge Incontinence-\par Patient happy with Myrbetriq 25 mg QD.\par Refills provided. 90 days supply with 1 refills.\par Consider to D/C Myrbteriq due to YAAKOV again today, however pt feels that she's doing better with Myrbteriq\par Precautions reviewed.\par Will return in 4 weeks for follow up and PVR check\par \par Incomplete bladder emptying\par PVR elevated today, 250cc\par Urine culture obtained.\par Will follow up.\par Will treat accordingly if necessary\par Will check PVR in 4 weeks \par \par Muscle Hypertonicity\par Continue Flexeril to 10mg BID \par \par Atrophic Vaginitis:\par Advised to continue to apply a pea size amount of the cream to the opening of the vagina three nights per week.\par  \par Recurrent UTIs\par Advised pt that she should get her urine tested for culture prior to self treating with Cipro, so that we could possible start her on low dose antibiotic for suppression. We have not seen any positive cultures from her\par Patient voiced her understanding and agrees with the plan.

## 2023-06-23 NOTE — PHYSICAL EXAM
[No Acute Distress] : in no acute distress [Well developed] : well developed [Well Nourished] : ~L well nourished [Chaperone Present] : A chaperone was present in the examining room during all aspects of the physical examination [FreeTextEntry1] : Indication: incomplete bladder emptying\par Urethra was prepped in sterile fashion and then a sterile non indwelling catheter (14F) was used by me to drain the bladder. The patient tolerated the procedure well.\par \par void: 0\par PVR: 250cc

## 2023-06-26 LAB — URINE CULTURE <10: NORMAL

## 2023-07-20 ENCOUNTER — NON-APPOINTMENT (OUTPATIENT)
Age: 83
End: 2023-07-20

## 2023-07-27 ENCOUNTER — APPOINTMENT (OUTPATIENT)
Dept: UROGYNECOLOGY | Facility: CLINIC | Age: 83
End: 2023-07-27
Payer: MEDICARE

## 2023-07-27 ENCOUNTER — NON-APPOINTMENT (OUTPATIENT)
Age: 83
End: 2023-07-27

## 2023-07-27 VITALS
BODY MASS INDEX: 20.14 KG/M2 | HEART RATE: 79 BPM | DIASTOLIC BLOOD PRESSURE: 74 MMHG | WEIGHT: 136 LBS | HEIGHT: 69 IN | SYSTOLIC BLOOD PRESSURE: 174 MMHG

## 2023-07-27 DIAGNOSIS — Z87.440 PERSONAL HISTORY OF URINARY (TRACT) INFECTIONS: ICD-10-CM

## 2023-07-27 LAB
BILIRUB UR QL STRIP: NEGATIVE
CLARITY UR: CLEAR
COLLECTION METHOD: NORMAL
GLUCOSE UR-MCNC: NEGATIVE
HCG UR QL: 0.2 EU/DL
HGB UR QL STRIP.AUTO: NEGATIVE
KETONES UR-MCNC: NEGATIVE
LEUKOCYTE ESTERASE UR QL STRIP: NEGATIVE
NITRITE UR QL STRIP: NEGATIVE
PH UR STRIP: 7.5
PROT UR STRIP-MCNC: NEGATIVE
SP GR UR STRIP: 1.01

## 2023-07-27 PROCEDURE — 81003 URINALYSIS AUTO W/O SCOPE: CPT | Mod: QW

## 2023-07-27 PROCEDURE — 99214 OFFICE O/P EST MOD 30 MIN: CPT | Mod: 25

## 2023-07-27 PROCEDURE — 51701 INSERT BLADDER CATHETER: CPT

## 2023-07-27 RX ORDER — MIRABEGRON 25 MG/1
25 TABLET, FILM COATED, EXTENDED RELEASE ORAL
Qty: 90 | Refills: 1 | Status: ACTIVE | COMMUNITY
Start: 2022-05-13 | End: 1900-01-01

## 2023-07-27 NOTE — DISCUSSION/SUMMARY
[FreeTextEntry1] : Urge Incontinence-\par Patient happy with Myrbetriq 25 mg QD.\par Refills provided. 90 days supply with 1 refills.\par Precautions reviewed.\par Follow up in 6 months, for med check/PVR check\par If PVR very elevated will stop Myrbteriq and see if that helps to improve PVR\par \par Incomplete bladder emptying\par PVR: 180 cc (normal  or less for the void)\par Urine culture obtained.\par Will follow up.\par Will treat accordingly if necessary\par Udip: negative\par Continue Silodosin 4mg \par Renal sono ordered to rule out hydronephrosis\par \par Muscle Hypertonicity\par Continue Flexeril to 10mg BID \par \par Atrophic Vaginitis:\par Advised to continue to apply a pea size amount of the cream to the opening of the vagina three nights per week.\par  \par Recurrent UTIs\par Advised patient and daughter that her symptoms of loss of balance, neuropathy, weakness are most likely due to her MS and not UTI. All urine cultures have been negative. Daughter is agreeing with this. Advised pt to speak to her neurologist and these MS flare ups.

## 2023-07-27 NOTE — COUNSELING
[FreeTextEntry1] : If you feel like you have an infection it is important for you to call our office and we will arrange testing of your urine.\par \par We will contact you if the urine results.\par \par Please continue taking Myrbetriq 25 mg for frequency. Refills sent to your pharmacy.\par \par Please continue taking Silodosin 4mg and Flexeril 10mg.\par \par Please continue to apply a pea size amount of estrogen cream to the opening of the vagina three times a week. \par \par Please schedule kidney sonogram to evaluate the kidneys due to history of not emptying the bladder fully. \par \par Please call my office if you have any issues with the cost or side effects of the medication. \par \par Schedule a 6 months follow up med check appointment with MARGARITA Alford.\par \par Please speak to your neurologist about flare ups of MS.

## 2023-07-27 NOTE — PHYSICAL EXAM
[Chaperone Present] : A chaperone was present in the examining room during all aspects of the physical examination [No Acute Distress] : in no acute distress [Well developed] : well developed [Well Nourished] : ~L well nourished [FreeTextEntry1] : Indication: YAAKOV\par Urethra was prepped in sterile fashion and then a sterile non indwelling catheter (14F) was used by me to drain the bladder. The patient tolerated the procedure well.\par \par void: 350cc\par PVR; 180 (normal 177 or less)

## 2023-07-27 NOTE — HISTORY OF PRESENT ILLNESS
[FreeTextEntry1] : Patient is here for 1 month med check for urge incontinence, YAAKOV and PVR check. Patient is here with her daughter today who is an NP. \par Last seen on 6/23/23 for med check.\par \par History of MS, seeing Dr Sorensen's group for years.\par Had UDS 2020, PVR was not abnormal but was told that she does not empty well and that is why she gets infections\par \par s/p Trospium x 3 months, severe dry mouth\par no glaucoma\par \par 10/29/20: renal sono: no hydro\par S/p flexeril prn back pain\par \par no prolapse\par \par Estrace cream \par \par s/p Flexeril 5mg BID\par 6/11/21, UDS: intermittent YAAKOV, add Flexeril and repeat PVR\par \par S/p Baclofen, said made her feet feel heavy\par S/p Tizanidine 2mg: make her legs weak, heavy\par \par Flexeril 10mg BID\par Pt is allergic to Sulfa and cannot take Flomax\par Silodosin 4mg QHS\par \par 1/28/22, PVR: 100cc\par 5/13/22, PVR: 30cc (on Flexeril 10mg BID and Silodosin 4mgQHS)\par \par Myrbetriq 25mg QD\par 6/23/23, PVR: 250cc ( On Flexeril 10mg BID, Silodosin 4mg QHS, Myrbetriq 25mg)\par \par Today, patient states she is happy with Myrbetriq 25 mg QD and is noticing improvement. Denies side effects. Patient is also taking Flexeril 10mg BID and Silodosin 4mg QHS. On 7/19 pt had her urine tested at a lab outside and took Cipro x 5 days that her daughter prescribed. Pt does not have UTI symptoms, rather pt gets neurological symptoms, weakness, neuropathy, loss of balance, and she thinks that it's a UTI because symptoms usually go away with Cipro for few days. Pt last saw her neurologist 1-2 months ago but did not tell them about these neurological flare ups. Pt does feel that she is able to empty her bladder \par \par Patient would like to continue all current medications.

## 2023-07-31 LAB — URINE CULTURE <10: NORMAL

## 2024-01-22 ENCOUNTER — APPOINTMENT (OUTPATIENT)
Dept: UROGYNECOLOGY | Facility: CLINIC | Age: 84
End: 2024-01-22
Payer: MEDICARE

## 2024-01-26 ENCOUNTER — APPOINTMENT (OUTPATIENT)
Dept: UROGYNECOLOGY | Facility: CLINIC | Age: 84
End: 2024-01-26
Payer: MEDICARE

## 2024-01-26 VITALS
HEIGHT: 69 IN | WEIGHT: 140 LBS | BODY MASS INDEX: 20.73 KG/M2 | HEART RATE: 77 BPM | DIASTOLIC BLOOD PRESSURE: 78 MMHG | SYSTOLIC BLOOD PRESSURE: 163 MMHG

## 2024-01-26 DIAGNOSIS — R33.9 RETENTION OF URINE, UNSPECIFIED: ICD-10-CM

## 2024-01-26 DIAGNOSIS — M62.89 OTHER SPECIFIED DISORDERS OF MUSCLE: ICD-10-CM

## 2024-01-26 DIAGNOSIS — N39.41 URGE INCONTINENCE: ICD-10-CM

## 2024-01-26 DIAGNOSIS — N95.2 POSTMENOPAUSAL ATROPHIC VAGINITIS: ICD-10-CM

## 2024-01-26 PROCEDURE — 99214 OFFICE O/P EST MOD 30 MIN: CPT

## 2024-01-26 RX ORDER — CIPROFLOXACIN HYDROCHLORIDE 500 MG/1
500 TABLET, FILM COATED ORAL TWICE DAILY
Qty: 14 | Refills: 0 | Status: COMPLETED | COMMUNITY
Start: 2023-11-08 | End: 2024-01-26

## 2024-01-26 RX ORDER — SILODOSIN 4 MG/1
4 CAPSULE ORAL DAILY
Qty: 90 | Refills: 1 | Status: COMPLETED | COMMUNITY
Start: 2021-09-27 | End: 2024-01-26

## 2024-01-26 NOTE — DISCUSSION/SUMMARY
[FreeTextEntry1] : Urge Incontinence- Patient happy with Myrbetriq 25 mg QD. Refills provided. 90 days supply with 1 refills. Precautions reviewed. Follow up in 6 months, for med check/PVR check, pt did not want to check PVR today If PVR very elevated will stop Myrbteriq and see if that helps to improve PVR  YAAKOV Renal imaging ordered to do in April 2024  Muscle Hypertonicity Continue Flexeril to 10mg QHS/ BID  Atrophic Vaginitis: Advised to continue to apply a pea size amount of the cream to the opening of the vagina three nights per week.

## 2024-01-26 NOTE — REASON FOR VISIT
[TextEntry] : Reason for visit: Follow Up  Voids per day:   every 2 hours Voids per night:   4-5 Urge incontinence: Yes  +urge +frequency +leakage but improved   Stress incontinence: No   Constipation: Yes Fecal incontinence: No   Vaginal bulge: No

## 2024-01-26 NOTE — COUNSELING
[FreeTextEntry1] : If you feel like you have an infection it is important for you to call our office and we will arrange testing of your urine.  Please continue taking Myrbetriq 25mg for frequency.   Please continue Flexeril 10mg twice a day for hypertonicity.   Please continue to apply a pea size amount of estrogen cream to the opening of the vagina three times a week.   Please call my office if you have any issues with the cost or side effects of the medication.   Schedule a 6 months follow up med check appointment with MARGARITA Alford.

## 2024-01-26 NOTE — HISTORY OF PRESENT ILLNESS
[FreeTextEntry1] : Patient is here for 6 months med check for urge incontinence, YAAKOV.  Last seen on 7/27/23 for med check.    History of MS, seeing Dr Sorensen's group for years. Had UDS 2020, PVR was not abnormal but was told that she does not empty well and that is why she gets infections   s/p Trospium x 3 months, severe dry mouth no glaucoma   10/29/20: renal sono: no hydro S/p flexeril prn back pain   no prolapse Estrace cream  s/p Flexeril 5mg BID 6/11/21, UDS: intermittent YAAKOV, add Flexeril and repeat PVR   1/28/22, PVR: 100cc 5/13/22, PVR: 30cc (on Flexeril 10mg BID and Silodosin 4mgQHS)  S/p Baclofen, said made her feet feel heavy S/p Tizanidine 2mg: make her legs weak, heavy  Flexeril 10mg BID Pt is allergic to Sulfa and cannot take Flomax s/p Silodosin 4mg QHS  Myrbetriq 25mg QD 6/23/23, PVR: 250cc (On Flexeril 10mg BID, Silodosin 4mg QHS, Myrbetriq 25mg)  10/19/23, Renal sono: no hydronephrosis bilaterally   Today, patient states she is happy with Flexeril 10mg QHS, Myrbetriq 25m QD. Stopped Silodosin, felt that she was takin too many medications. Feels that Myrbetriq 25mg is helping her with frequency symptoms. Feels that she empties the bladder well. Happy overall with current management and does not want to change anything.  Denies side effects. Patient does not feel she has an infection. Last time she felt UTI symptoms over New years weekend and tested her urine at home and took Cipro prescribed by her daughter, NP.

## 2024-07-29 ENCOUNTER — APPOINTMENT (OUTPATIENT)
Dept: UROGYNECOLOGY | Facility: CLINIC | Age: 84
End: 2024-07-29
Payer: MEDICARE

## 2024-07-29 VITALS
BODY MASS INDEX: 20.51 KG/M2 | SYSTOLIC BLOOD PRESSURE: 165 MMHG | HEART RATE: 70 BPM | WEIGHT: 138.5 LBS | HEIGHT: 69 IN | DIASTOLIC BLOOD PRESSURE: 70 MMHG

## 2024-07-29 DIAGNOSIS — R33.9 RETENTION OF URINE, UNSPECIFIED: ICD-10-CM

## 2024-07-29 DIAGNOSIS — M62.89 OTHER SPECIFIED DISORDERS OF MUSCLE: ICD-10-CM

## 2024-07-29 DIAGNOSIS — N95.2 POSTMENOPAUSAL ATROPHIC VAGINITIS: ICD-10-CM

## 2024-07-29 DIAGNOSIS — Z87.440 PERSONAL HISTORY OF URINARY (TRACT) INFECTIONS: ICD-10-CM

## 2024-07-29 DIAGNOSIS — N39.41 URGE INCONTINENCE: ICD-10-CM

## 2024-07-29 PROCEDURE — 99214 OFFICE O/P EST MOD 30 MIN: CPT | Mod: 25

## 2024-07-29 PROCEDURE — 99459 PELVIC EXAMINATION: CPT

## 2024-07-29 PROCEDURE — 51701 INSERT BLADDER CATHETER: CPT

## 2024-07-29 RX ORDER — SILODOSIN 4 MG/1
4 CAPSULE ORAL
Qty: 30 | Refills: 2 | Status: ACTIVE | COMMUNITY
Start: 2024-07-29 | End: 1900-01-01

## 2024-07-29 NOTE — DISCUSSION/SUMMARY
[FreeTextEntry1] : Incomplete bladder emptying PVR: 130cc (borderline) Renal imaging ordered to rule out hydronephrosis Will add Rx Silodosin 4mg QHS, 30 days, 2 refills Follow up in 2 months for PVR check  Muscle Hypertonicity Continue Flexeril to 10mg QHS Precautions reviewed  History of UTIs    Urine culture obtained. Will follow up. Will treat accordingly if necessary   Urge Incontinence Most likely due to overflow incontinence D/C Myrbetriq Follow up next visit  Atrophic Vaginitis: Advised to continue to apply a pea size amount of the cream to the opening of the vagina three nights per week.

## 2024-07-29 NOTE — COUNSELING
[FreeTextEntry1] : If you feel like you have an infection it is important for you to call our office and we will arrange testing of your urine.  We will contact you if the urine results are abnormal.  Please continue taking Flexeril 10mg at bedtime.   Please begin taking Silodosin 4 mg at bedtime. It takes up to 6 weeks to go into full effect. Please  your refill when you complete the 1st bottle.  Please continue to apply a pea size amount of estrogen cream to the opening of the vagina three times a week.   Please call my office if you have any issues with the cost or side effects of the medication.   Schedule a 2 months follow up med check appointment with MARGARITA Alford.

## 2024-07-29 NOTE — PHYSICAL EXAM
[Chaperone Present] : A chaperone was present in the examining room during all aspects of the physical examination [[id number]] : A chaperone was present during the pelvic exam. [No Acute Distress] : in no acute distress [Well developed] : well developed [Well Nourished] : ~L well nourished [FreeTextEntry2] : Vickie FRANK [FreeTextEntry1] : Indication: YAAKOV Urethra was prepped in sterile fashion and then a sterile non indwelling catheter (14F) was used by me to drain the bladder. The patient tolerated the procedure well.  void: 150cc PVR: 130cc

## 2024-07-29 NOTE — HISTORY OF PRESENT ILLNESS
[FreeTextEntry1] : Patient is here for 6 months med check for urge incontinence. Last seen on 1/26/24 for med check.   History of MS, seeing Dr Sorensen's group for years. Had UDS 2020, PVR was not abnormal but was told that she does not empty well and that is why she gets infections  s/p Trospium x 3 months, severe dry mouth no glaucoma  10/29/20: renal sono: no hydro S/p flexeril prn back pain  no prolapse Estrace cream  s/p Flexeril 5mg BID 6/11/21, UDS: intermittent YAAKOV, add Flexeril and repeat PVR  1/28/22, PVR: 100cc 5/13/22, PVR: 30cc (on Flexeril 10mg BID and Silodosin 4mgQHS)  S/p Baclofen, said made her feet feel heavy S/p Tizanidine 2mg: make her legs weak, heavy  s/p Flexeril 10mg BID Pt is allergic to Sulfa and cannot take Flomax s/p Silodosin 4mg QHS: felt that she was urinating larger amounts and leakage of urine with urgency worse at night   s/p Myrbetriq 25mg QD: felt that she was urinating larger amounts and leakage of urine with urgency worse at night  6/23/23, PVR: 250cc (On Flexeril 10mg BID, Silodosin 4mg QHS, Myrbetriq 25mg)  10/19/23, Renal sono: no hydronephrosis bilaterally  Flexeril 10mg QHS   Today, patient states that she stopped Myrbetriq recently because she felt that she was urinating large amounts and was having leakage of urine at night before getting to the bathroom, wars pull ups and sometimes they are soaked. Stopped Myrbetriq 1.5 weeks ago, and did not notice any changes. Feels that she empties the bladder well most of the time. Does not manipulate to empty the bladder at home. Takes Flexeril 10mg few times a week. Doesn't like to take medications. Was treated by her daughter with Cipro on 7/4 for UTI symptoms without testing. Denies side effects. Patient does not feel she has an infection. Did not do renal sono as advised.

## 2024-07-29 NOTE — REASON FOR VISIT
[TextEntry] : Reason for visit: 6 month med check Voids per day:  8-9  Voids per night:  5  Urge incontinence: Yes (+) urgency (+) leakage Stress incontinence: No Constipation: Yes, takes senna bid    Fecal incontinence: No Vaginal bulge: No

## 2024-07-31 LAB — URINE CULTURE <10: NORMAL

## 2024-10-03 ENCOUNTER — OUTPATIENT (OUTPATIENT)
Dept: OUTPATIENT SERVICES | Facility: HOSPITAL | Age: 84
LOS: 1 days | End: 2024-10-03
Payer: MEDICARE

## 2024-10-03 DIAGNOSIS — Z98.890 OTHER SPECIFIED POSTPROCEDURAL STATES: Chronic | ICD-10-CM

## 2024-10-03 DIAGNOSIS — N60.49 MAMMARY DUCT ECTASIA OF UNSPECIFIED BREAST: Chronic | ICD-10-CM

## 2024-10-03 DIAGNOSIS — Z12.31 ENCOUNTER FOR SCREENING MAMMOGRAM FOR MALIGNANT NEOPLASM OF BREAST: ICD-10-CM

## 2024-10-03 PROCEDURE — 77063 BREAST TOMOSYNTHESIS BI: CPT | Mod: 26

## 2024-10-03 PROCEDURE — 77067 SCR MAMMO BI INCL CAD: CPT | Mod: 26

## 2024-10-03 PROCEDURE — 77063 BREAST TOMOSYNTHESIS BI: CPT

## 2024-10-03 PROCEDURE — 77067 SCR MAMMO BI INCL CAD: CPT

## 2024-10-04 DIAGNOSIS — Z12.31 ENCOUNTER FOR SCREENING MAMMOGRAM FOR MALIGNANT NEOPLASM OF BREAST: ICD-10-CM

## 2024-10-28 ENCOUNTER — APPOINTMENT (OUTPATIENT)
Dept: UROGYNECOLOGY | Facility: CLINIC | Age: 84
End: 2024-10-28
Payer: MEDICARE

## 2024-10-28 VITALS
WEIGHT: 138 LBS | DIASTOLIC BLOOD PRESSURE: 67 MMHG | HEIGHT: 69 IN | BODY MASS INDEX: 20.44 KG/M2 | HEART RATE: 70 BPM | SYSTOLIC BLOOD PRESSURE: 143 MMHG

## 2024-10-28 DIAGNOSIS — N95.2 POSTMENOPAUSAL ATROPHIC VAGINITIS: ICD-10-CM

## 2024-10-28 DIAGNOSIS — R33.9 RETENTION OF URINE, UNSPECIFIED: ICD-10-CM

## 2024-10-28 DIAGNOSIS — M62.89 OTHER SPECIFIED DISORDERS OF MUSCLE: ICD-10-CM

## 2024-10-28 DIAGNOSIS — N39.41 URGE INCONTINENCE: ICD-10-CM

## 2024-10-28 DIAGNOSIS — Z87.440 PERSONAL HISTORY OF URINARY (TRACT) INFECTIONS: ICD-10-CM

## 2024-10-28 PROCEDURE — 99214 OFFICE O/P EST MOD 30 MIN: CPT | Mod: 25

## 2024-10-28 PROCEDURE — 51701 INSERT BLADDER CATHETER: CPT

## 2024-10-28 PROCEDURE — 99459 PELVIC EXAMINATION: CPT

## 2024-10-30 LAB — URINE CULTURE <10: NORMAL

## 2025-03-04 ENCOUNTER — APPOINTMENT (OUTPATIENT)
Dept: VASCULAR SURGERY | Facility: CLINIC | Age: 85
End: 2025-03-04
Payer: MEDICARE

## 2025-03-04 VITALS
HEIGHT: 69 IN | BODY MASS INDEX: 20.59 KG/M2 | WEIGHT: 139 LBS | DIASTOLIC BLOOD PRESSURE: 70 MMHG | SYSTOLIC BLOOD PRESSURE: 131 MMHG

## 2025-03-04 DIAGNOSIS — M79.89 OTHER SPECIFIED SOFT TISSUE DISORDERS: ICD-10-CM

## 2025-03-04 DIAGNOSIS — I87.2 VENOUS INSUFFICIENCY (CHRONIC) (PERIPHERAL): ICD-10-CM

## 2025-03-04 PROCEDURE — 99203 OFFICE O/P NEW LOW 30 MIN: CPT

## 2025-03-04 PROCEDURE — 93970 EXTREMITY STUDY: CPT

## 2025-03-04 PROCEDURE — 99213 OFFICE O/P EST LOW 20 MIN: CPT

## 2025-03-05 PROBLEM — I87.2 VENOUS INSUFFICIENCY: Status: ACTIVE | Noted: 2025-03-05

## 2025-03-05 PROBLEM — M79.89 LEG SWELLING: Status: ACTIVE | Noted: 2025-03-05

## 2025-05-28 ENCOUNTER — NON-APPOINTMENT (OUTPATIENT)
Age: 85
End: 2025-05-28

## 2025-05-28 ENCOUNTER — APPOINTMENT (OUTPATIENT)
Dept: UROGYNECOLOGY | Facility: CLINIC | Age: 85
End: 2025-05-28
Payer: MEDICARE

## 2025-05-28 VITALS
SYSTOLIC BLOOD PRESSURE: 170 MMHG | HEIGHT: 69 IN | BODY MASS INDEX: 21.03 KG/M2 | HEART RATE: 67 BPM | WEIGHT: 142 LBS | DIASTOLIC BLOOD PRESSURE: 67 MMHG

## 2025-05-28 VITALS — SYSTOLIC BLOOD PRESSURE: 152 MMHG | DIASTOLIC BLOOD PRESSURE: 71 MMHG

## 2025-05-28 DIAGNOSIS — Z87.19 PERSONAL HISTORY OF OTHER DISEASES OF THE DIGESTIVE SYSTEM: ICD-10-CM

## 2025-05-28 DIAGNOSIS — N95.2 POSTMENOPAUSAL ATROPHIC VAGINITIS: ICD-10-CM

## 2025-05-28 DIAGNOSIS — Z87.440 PERSONAL HISTORY OF URINARY (TRACT) INFECTIONS: ICD-10-CM

## 2025-05-28 DIAGNOSIS — R33.9 RETENTION OF URINE, UNSPECIFIED: ICD-10-CM

## 2025-05-28 DIAGNOSIS — M62.89 OTHER SPECIFIED DISORDERS OF MUSCLE: ICD-10-CM

## 2025-05-28 PROCEDURE — G2211 COMPLEX E/M VISIT ADD ON: CPT

## 2025-05-28 PROCEDURE — 99214 OFFICE O/P EST MOD 30 MIN: CPT

## 2025-05-28 RX ORDER — CLONIDINE HYDROCHLORIDE 0.1 MG/1
0.1 TABLET ORAL
Refills: 0 | Status: ACTIVE | COMMUNITY

## 2025-05-28 RX ORDER — FUROSEMIDE 40 MG/1
40 TABLET ORAL
Refills: 0 | Status: ACTIVE | COMMUNITY

## 2025-05-28 RX ORDER — METOPROLOL TARTRATE 50 MG/1
50 TABLET ORAL
Refills: 0 | Status: ACTIVE | COMMUNITY

## 2025-06-09 ENCOUNTER — APPOINTMENT (OUTPATIENT)
Dept: VASCULAR SURGERY | Facility: CLINIC | Age: 85
End: 2025-06-09
Payer: MEDICARE

## 2025-06-09 VITALS — BODY MASS INDEX: 20.73 KG/M2 | WEIGHT: 140 LBS | HEIGHT: 69 IN

## 2025-06-09 VITALS — SYSTOLIC BLOOD PRESSURE: 131 MMHG | DIASTOLIC BLOOD PRESSURE: 74 MMHG

## 2025-06-09 PROBLEM — I65.23 ARTERIOSCLEROSIS OF BOTH CAROTID ARTERIES: Status: ACTIVE | Noted: 2025-06-09

## 2025-06-09 PROCEDURE — 93880 EXTRACRANIAL BILAT STUDY: CPT

## 2025-06-09 PROCEDURE — 99213 OFFICE O/P EST LOW 20 MIN: CPT

## 2025-09-16 ENCOUNTER — APPOINTMENT (OUTPATIENT)
Dept: VASCULAR SURGERY | Facility: CLINIC | Age: 85
End: 2025-09-16
Payer: MEDICARE

## 2025-09-16 VITALS
SYSTOLIC BLOOD PRESSURE: 126 MMHG | BODY MASS INDEX: 20.59 KG/M2 | DIASTOLIC BLOOD PRESSURE: 71 MMHG | HEIGHT: 69 IN | WEIGHT: 139 LBS

## 2025-09-16 PROCEDURE — 99203 OFFICE O/P NEW LOW 30 MIN: CPT
